# Patient Record
Sex: MALE | Race: BLACK OR AFRICAN AMERICAN | NOT HISPANIC OR LATINO
[De-identification: names, ages, dates, MRNs, and addresses within clinical notes are randomized per-mention and may not be internally consistent; named-entity substitution may affect disease eponyms.]

---

## 2021-05-28 PROBLEM — Z00.00 ENCOUNTER FOR PREVENTIVE HEALTH EXAMINATION: Status: ACTIVE | Noted: 2021-05-28

## 2021-08-24 ENCOUNTER — APPOINTMENT (OUTPATIENT)
Dept: HEPATOLOGY | Facility: CLINIC | Age: 41
End: 2021-08-24

## 2022-03-25 ENCOUNTER — APPOINTMENT (OUTPATIENT)
Dept: HEPATOLOGY | Facility: CLINIC | Age: 42
End: 2022-03-25
Payer: COMMERCIAL

## 2022-03-25 VITALS
SYSTOLIC BLOOD PRESSURE: 145 MMHG | TEMPERATURE: 97.6 F | WEIGHT: 256 LBS | RESPIRATION RATE: 15 BRPM | OXYGEN SATURATION: 98 % | BODY MASS INDEX: 37.92 KG/M2 | DIASTOLIC BLOOD PRESSURE: 93 MMHG | HEIGHT: 69 IN | HEART RATE: 98 BPM

## 2022-03-25 DIAGNOSIS — E66.9 OBESITY, UNSPECIFIED: ICD-10-CM

## 2022-03-25 DIAGNOSIS — F10.20 ALCOHOL DEPENDENCE, UNCOMPLICATED: ICD-10-CM

## 2022-03-25 DIAGNOSIS — Z78.9 OTHER SPECIFIED HEALTH STATUS: ICD-10-CM

## 2022-03-25 DIAGNOSIS — Z63.5 DISRUPTION OF FAMILY BY SEPARATION AND DIVORCE: ICD-10-CM

## 2022-03-25 DIAGNOSIS — Z60.2 PROBLEMS RELATED TO LIVING ALONE: ICD-10-CM

## 2022-03-25 DIAGNOSIS — E78.5 HYPERLIPIDEMIA, UNSPECIFIED: ICD-10-CM

## 2022-03-25 PROCEDURE — 99204 OFFICE O/P NEW MOD 45 MIN: CPT

## 2022-03-25 SDOH — SOCIAL STABILITY - SOCIAL INSECURITY: DISRUPTION OF FAMILY BY SEPARATION AND DIVORCE: Z63.5

## 2022-03-25 SDOH — SOCIAL STABILITY - SOCIAL INSECURITY: PROBLEMS RELATED TO LIVING ALONE: Z60.2

## 2022-03-28 LAB
25(OH)D3 SERPL-MCNC: 31.4 NG/ML
AFP-TM SERPL-MCNC: 2.4 NG/ML
ALBUMIN SERPL ELPH-MCNC: 4.4 G/DL
ALP BLD-CCNC: 78 U/L
ALT SERPL-CCNC: 113 U/L
ANION GAP SERPL CALC-SCNC: 15 MMOL/L
AST SERPL-CCNC: 70 U/L
BASOPHILS # BLD AUTO: 0.03 K/UL
BASOPHILS NFR BLD AUTO: 0.4 %
BILIRUB DIRECT SERPL-MCNC: 0.1 MG/DL
BILIRUB SERPL-MCNC: 0.4 MG/DL
BUN SERPL-MCNC: 12 MG/DL
CALCIUM SERPL-MCNC: 10 MG/DL
CHLORIDE SERPL-SCNC: 101 MMOL/L
CHOLEST SERPL-MCNC: 192 MG/DL
CO2 SERPL-SCNC: 23 MMOL/L
CREAT SERPL-MCNC: 0.8 MG/DL
DEPRECATED KAPPA LC FREE/LAMBDA SER: 1.18 RATIO
EGFR: 115 ML/MIN/1.73M2
EOSINOPHIL # BLD AUTO: 0.07 K/UL
EOSINOPHIL NFR BLD AUTO: 1 %
FERRITIN SERPL-MCNC: 398 NG/ML
GGT SERPL-CCNC: 94 U/L
GLUCOSE SERPL-MCNC: 119 MG/DL
HBV CORE IGG+IGM SER QL: NONREACTIVE
HBV SURFACE AB SERPL IA-ACNC: 8 MIU/ML
HBV SURFACE AG SER QL: NONREACTIVE
HCT VFR BLD CALC: 47.6 %
HCV AB SER QL: NONREACTIVE
HCV S/CO RATIO: 0.14 S/CO
HDLC SERPL-MCNC: 43 MG/DL
HEPATITIS A IGG ANTIBODY: NONREACTIVE
HGB BLD-MCNC: 14.7 G/DL
HIV1+2 AB SPEC QL IA.RAPID: NONREACTIVE
IGA SER QL IEP: 330 MG/DL
IGG SER QL IEP: 1474 MG/DL
IGM SER QL IEP: 76 MG/DL
IMM GRANULOCYTES NFR BLD AUTO: 0.7 %
INR PPP: 1.05 RATIO
IRON SATN MFR SERPL: 27 %
IRON SERPL-MCNC: 92 UG/DL
KAPPA LC CSF-MCNC: 1.66 MG/DL
KAPPA LC SERPL-MCNC: 1.96 MG/DL
LDLC SERPL CALC-MCNC: 115 MG/DL
LYMPHOCYTES # BLD AUTO: 1.83 K/UL
LYMPHOCYTES NFR BLD AUTO: 25.3 %
MAGNESIUM SERPL-MCNC: 2.1 MG/DL
MAN DIFF?: NORMAL
MCHC RBC-ENTMCNC: 28.2 PG
MCHC RBC-ENTMCNC: 30.9 GM/DL
MCV RBC AUTO: 91.2 FL
MITOCHONDRIA AB SER IF-ACNC: NORMAL
MONOCYTES # BLD AUTO: 0.66 K/UL
MONOCYTES NFR BLD AUTO: 9.1 %
NEUTROPHILS # BLD AUTO: 4.58 K/UL
NEUTROPHILS NFR BLD AUTO: 63.5 %
NONHDLC SERPL-MCNC: 150 MG/DL
PHOSPHATE SERPL-MCNC: 3.3 MG/DL
PLATELET # BLD AUTO: 288 K/UL
POTASSIUM SERPL-SCNC: 4.1 MMOL/L
PROT SERPL-MCNC: 7.6 G/DL
PT BLD: 12.4 SEC
RBC # BLD: 5.22 M/UL
RBC # FLD: 13.2 %
SMOOTH MUSCLE AB SER QL IF: NORMAL
SODIUM SERPL-SCNC: 139 MMOL/L
TIBC SERPL-MCNC: 336 UG/DL
TRIGL SERPL-MCNC: 172 MG/DL
UIBC SERPL-MCNC: 244 UG/DL
WBC # FLD AUTO: 7.22 K/UL

## 2022-03-29 LAB
A1AT PHENOTYP SERPL-IMP: NORMAL
A1AT SERPL-MCNC: 139 MG/DL

## 2022-03-30 LAB
ANA SER IF-ACNC: NEGATIVE
ZINC SERPL-MCNC: 66 UG/DL

## 2022-04-04 PROBLEM — Z60.2 LIVES ALONE: Status: ACTIVE | Noted: 2022-04-04

## 2022-04-04 PROBLEM — Z63.5 DIVORCED: Status: ACTIVE | Noted: 2022-04-04

## 2022-04-04 PROBLEM — Z78.9 HEAVY DRINKER OF ALCOHOL: Status: ACTIVE | Noted: 2022-04-04

## 2022-04-04 NOTE — HISTORY OF PRESENT ILLNESS
[de-identified] : Mr. Jackson is a 40 yo M with obesity and dyslipidemia, who is being referred for consultation regarding elevated liver enzymes.\par \par PCP: MARVIN Napoles -> MARVIN Rodriguez\par Urology: Dr. Eubanks\par \par Abdominal US (8/4/20): Diffuse fatty infiltration of the liver.\par \par He complains of right lower abdominal pain that radiates from his upper scrotum and that sometimes also radiates to his right back. The pain is not daily but definitely at least weekly and has been going on for >1 year. He does not take any pain medications (including OTC). He has noticed that certain positions and movements seem to affect the pain. The pain is not related to meals, defecation, or urination. He has been seen by a urologist and says that he had a scrotal ultrasound and was told that he had "calcium build up", but that he later had a pelvic CT 2 weeks ago that he says showed a left-sided kidney stone but "nothing to explain the pain" that is right-sided.\par \par He weighed 275 lbs at his heaviest around 2012 but intentionally lost some weight. His lowest recent weight was 205 lbs in 2014, but he says since then he has been "back and forth" from 230-260 lbs. He typically loses weight by adhering to a plant-based diet for a month at a time and exercising. During that month, he also abstains from alcohol, which he recognizes is a major source of excess calories for him. He is planning to do a plant-based diet again for the month of April and also was planning to take a "total body cleanse" from [employer] then.\par \par He calls himself a "functional alcoholic". He typically drinks tequila (2-4 pints/week), only on the his 2 days off per week that he does not work, but on those days sometimes starting in the morning and throughout the day, depending on his plans. He denies drinking on days that he works (5 days/week) and does not have withdrawal symptoms on those days. He occasionally drinks beer on his days off as well, but says that had only been a handful of times per month until recently, when he has been drinking 4 cans/week regularly in addition to the tequila. He had a DWI in 2005 or 2006 and subsequently completed a mandated outpatient rehabilitation program. He stayed sober for a while after that, then gradually started drinking again. He denies any other history of legal problems related to alcohol. He denies any work-related problems related to alcohol. He says that in prior relationships, his partners have brought up his alcohol consumption during arguments with him.\par \par He lives alone. He has a girlfriend. He has worked as a  on [employer] for the past 8 years, typically working 6pm-6am shifts 5 days per week. He has two children who live with his ex-wife (son born in 2002 and daughter born in 2007).\par \par He has no known family history of any liver disease.

## 2022-04-04 NOTE — ASSESSMENT
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 I reviewed the natural history, evaluation and staging of the disease including FibroScan (ordered today), and prognosis, including possible risks of development of alcoholic hepatitis (which can be life-threatening in the short term), cirrhosis, and hepatocellular carcinoma (HCC) as well as increased risks of diabetes mellitus, chronic kidney disease, and cardiovascular disease.\par \par We discussed the importance of long-term alcohol moderation, which for healthy men means <=2 standard drinks/day and <=14 standard drinks/week. We discussed that binge drinking and drinking without meals can further increase risk of ALD. We discussed that coffee consumption may lower risk of ALD somewhat. We discussed that if he has evidence of significant ALD including based on repeat labs today or based on his ultrasound or FibroScan, complete alcohol abstinence may be necessary to promote liver healing/regeneration. We also discussed that given his reported alcohol history, total abstinence may also be the best way to address his alcohol use disorder (AUD). He declined referral for alcohol rehabilitation today, but was open to trying MAT to reduce alcohol cravings/urges to help him moderate his intake or preferably abstain. We discussed potential benefits and risks of alternatives for MAT and will start a trial of naltrexone 50 mg po daily.\par \par We discussed that additional lifestyle modifications are also crucial for management of the NAFLD component of his fatty liver. I recommended gradual weight loss of at least 10% of his body weight. I recommended dietary changes including coffee consumption (up to 3-4 cups/day if desired), adherence to a Mediterranean style diet with increased consumption of vegetables and lean proteins, avoidance of red meat and high fructose corn syrup, and avoidance of calorie-containing beverages. I recommended moderate intensity exercise for a minimum of 20-30 minutes at least 3 times per week. These changes have been shown to lead to regression or even resolution of steatosis, inflammation, and even fibrosis in some patients.\par \par Next follow-up: 4-6 weeks

## 2022-04-04 NOTE — CONSULT LETTER
[Dear  ___] : Dear ~ASHA, [Consult Letter:] : I had the pleasure of evaluating your patient, [unfilled]. [Please see my note below.] : Please see my note below. [Consult Closing:] : Thank you very much for allowing me to participate in the care of this patient.  If you have any questions, please do not hesitate to contact me. [FreeTextEntry2] : MARVIN Rodriguez [FreeTextEntry3] : Sincerely,\par \par Luke Murphy M.D., Ph.D.\par Director, Women's Liver Health Program, [facility]\par Transplant Hepatology, [facility]\par  of Medicine\par [employer] [facility] [employer] [facility]\par [address]\[place] [address]\par Tel: [phone]\par Fax: [phone]\par Cell: [phone]\par E-mail: [email or web address]

## 2022-04-20 ENCOUNTER — APPOINTMENT (OUTPATIENT)
Dept: ULTRASOUND IMAGING | Facility: CLINIC | Age: 42
End: 2022-04-20
Payer: COMMERCIAL

## 2022-04-20 ENCOUNTER — OUTPATIENT (OUTPATIENT)
Dept: OUTPATIENT SERVICES | Facility: HOSPITAL | Age: 42
LOS: 1 days | End: 2022-04-20
Payer: COMMERCIAL

## 2022-04-20 DIAGNOSIS — R74.8 ABNORMAL LEVELS OF OTHER SERUM ENZYMES: ICD-10-CM

## 2022-04-20 DIAGNOSIS — K70.0 ALCOHOLIC FATTY LIVER: ICD-10-CM

## 2022-04-20 PROCEDURE — 76700 US EXAM ABDOM COMPLETE: CPT

## 2022-04-20 PROCEDURE — 76700 US EXAM ABDOM COMPLETE: CPT | Mod: 26

## 2022-07-01 ENCOUNTER — APPOINTMENT (OUTPATIENT)
Dept: HEPATOLOGY | Facility: CLINIC | Age: 42
End: 2022-07-01

## 2022-07-01 VITALS
OXYGEN SATURATION: 100 % | HEIGHT: 69 IN | HEART RATE: 96 BPM | BODY MASS INDEX: 38.21 KG/M2 | WEIGHT: 258 LBS | DIASTOLIC BLOOD PRESSURE: 83 MMHG | RESPIRATION RATE: 16 BRPM | TEMPERATURE: 97.7 F | SYSTOLIC BLOOD PRESSURE: 135 MMHG

## 2022-07-01 PROCEDURE — 91200 LIVER ELASTOGRAPHY: CPT

## 2022-07-01 PROCEDURE — 99214 OFFICE O/P EST MOD 30 MIN: CPT

## 2022-07-01 RX ORDER — NALTREXONE HYDROCHLORIDE 50 MG/1
50 TABLET, FILM COATED ORAL
Qty: 30 | Refills: 5 | Status: ACTIVE | COMMUNITY
Start: 2022-03-25 | End: 1900-01-01

## 2022-07-01 NOTE — CONSULT LETTER
[Dear  ___] : Dear ~ASHA, [Consult Letter:] : I had the pleasure of evaluating your patient, [unfilled]. [Please see my note below.] : Please see my note below. [Consult Closing:] : Thank you very much for allowing me to participate in the care of this patient.  If you have any questions, please do not hesitate to contact me. [FreeTextEntry2] : MARVIN Rodriguez [FreeTextEntry3] : Sincerely,\par \par Luke Murphy M.D., Ph.D.\par Director, Women's Liver Health Program, [facility]\par Transplant Hepatology, [facility]\par  of Medicine\par [employer] [facility] at [facility]\par [address]\[place] [address]\par Tel: [phone]\par Fax: [phone]\par Cell: [phone]\par E-mail: [email or web address]

## 2022-07-01 NOTE — ASSESSMENT
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 Prior sonograms (8/4/20 and 4/20/22) showed fatty liver, but with no evidence of cirrhosis or suspicious hepatic lesion.\par \par Since his last visit, he has been abstinent from alcohol for the past 3 months apart from 4 non-sustained slips (only 1 of which was a significant binge). He has also been exercising regularly. This has not translated into consistent weight loss yet. I congratulated him on these healthy changes.\par \par Given that he found naltrexone to be beneficial and with only transient adverse effects noted, I encouraged him to resume that medication. If he has persistent side effects, he can call me and we can consider switching to an alternative such as acamprosate. Naltrexone was refilled today.\par \par We will re-check labs today to assess for interval improvement in his liver enzymes with the above healthy changes. FibroScan today suggested minimal hepatic fibrosis and only mild steatosis, though with high IQR (variability) of the liver stiffness measurements suggesting that there could be underestimation. If his liver enzymes are not improving, he may need re-staging with an alternative modality such as MR elastography or liver biopsy but will defer for now.\par \par In addition to alcohol abstinence, I encouraged him to try to gradually lose weight (at least 10% of his body weight). I recommended dietary changes including coffee consumption (up to 3-4 cups/day if desired), adherence to a Mediterranean style diet with increased consumption of vegetables and lean proteins, avoidance of red meat and high fructose corn syrup, and avoidance of calorie-containing beverages. I recommended moderate intensity exercise for a minimum of 20-30 minutes at least 3 times per week. These changes have been shown to lead to regression or even resolution of steatosis, inflammation, and even fibrosis in some patients.\par \par He is a potential candidate for semaglutide (Ozempic or Wegovy) for obesity and off label for presumed concomitant nonalcoholic steatohepatitis. We discussed the potential benefits and risks of semaglutide today in detail. He is potentially interested but preferred to re-address this again at his next visit rather than starting the medication now.\par \par He is non-immune to HAV and only has borderline HBsAb and therefore I recommended vaccinations today, which he intends to schedule.\par \par Next follow-up: 3 months

## 2022-07-01 NOTE — HISTORY OF PRESENT ILLNESS
[de-identified] : Mr. Jackson is a 41 yo very pleasant Black M with obesity, dyslipidemia, and nephrolithiasis, who is being seen for follow-up of elevated liver enzymes secondary to alcohol-associated liver disease (ALD) as well as possible concomitant nonalcoholic (metabolic) fatty liver disease (NAFLD).\par \par PCP: NP Nabila Napoles -> NP Nicci Rodriguez\par Urology: Dr. Eubanks\par \par Abdominal US (8/4/20): Diffuse fatty infiltration of the liver.\par \par Abdominal US (4/20/22): Mild hepatomegaly and steatosis. Normal size spleen. No ascites.\par \par FibroScan (7/1/22): Median liver stiffness 6.4 kPA (consistent with F0-1 fibrosis, but with elevated IQR 30% suggesting possible underestimation) and CAP score 271 dB/m (consistent with S0-1 steatosis).\par \par He was last seen by me on 3/25/22 and is here today for follow-up and FibroScan (above). He reports that since his last visit, he was abstinent from alcohol for the past 3 months (April, May, and June), apart from 1 day at the end of May or beginning of June when he drank a pint of liquor at home and three other non-consecutive days prior to that when he had 2 drinks/day socially. He has not had any alcohol for the past 3+ weeks. He says the naltrexone was very helpful for cutting out alcohol, but that he self-discontinued it 3 weeks ago. He is open to resuming it, though notes that he thinks it causes some side effects of somnolence and "brain fog, just not feeling like myself" for the first few days only when first initiating it but not after those first few days if he continues the medication. In addition to cutting out alcohol, he has been exercising for the past 2 months, doing 45 minutes on an elliptical machine 2-3 times/week. He has not lost any weight yet but has noticed that his clothes are fitting better and so is hoping the weight will start to reflect his healthy changes soon too. He says his physical therapist recently suggested to him that he may benefit from Wegovy, but he doesn't want to start it yet. He has avoided herbal/dietary supplements since our last visit as discussed.\par \par He lives alone. He has a girlfriend. He has worked as a  on [employer] for the past 8 years, typically working 6pm-6am shifts 5 days per week. He has two children who live with his ex-wife (son born in 2002 and daughter born in 2007).\par \par He has no known family history of any liver disease.

## 2022-07-06 LAB
AFP-TM SERPL-MCNC: 1.9 NG/ML
ALBUMIN SERPL ELPH-MCNC: 4.1 G/DL
ALP BLD-CCNC: 68 U/L
ALT SERPL-CCNC: 22 U/L
ANION GAP SERPL CALC-SCNC: 11 MMOL/L
AST SERPL-CCNC: 20 U/L
BASOPHILS # BLD AUTO: 0.04 K/UL
BASOPHILS NFR BLD AUTO: 0.7 %
BILIRUB DIRECT SERPL-MCNC: 0.1 MG/DL
BILIRUB SERPL-MCNC: 0.3 MG/DL
BUN SERPL-MCNC: 11 MG/DL
CALCIUM SERPL-MCNC: 9.2 MG/DL
CHLORIDE SERPL-SCNC: 105 MMOL/L
CHOLEST SERPL-MCNC: 155 MG/DL
CO2 SERPL-SCNC: 24 MMOL/L
CREAT SERPL-MCNC: 0.91 MG/DL
EGFR: 109 ML/MIN/1.73M2
EOSINOPHIL # BLD AUTO: 0.27 K/UL
EOSINOPHIL NFR BLD AUTO: 4.5 %
ESTIMATED AVERAGE GLUCOSE: 120 MG/DL
GLUCOSE SERPL-MCNC: 90 MG/DL
HBA1C MFR BLD HPLC: 5.8 %
HCT VFR BLD CALC: 42.7 %
HDLC SERPL-MCNC: 39 MG/DL
HGB BLD-MCNC: 13.3 G/DL
IMM GRANULOCYTES NFR BLD AUTO: 0.3 %
INR PPP: 1.01 RATIO
LDLC SERPL CALC-MCNC: 97 MG/DL
LYMPHOCYTES # BLD AUTO: 2.51 K/UL
LYMPHOCYTES NFR BLD AUTO: 41.9 %
MAN DIFF?: NORMAL
MCHC RBC-ENTMCNC: 27.8 PG
MCHC RBC-ENTMCNC: 31.1 GM/DL
MCV RBC AUTO: 89.1 FL
MONOCYTES # BLD AUTO: 0.55 K/UL
MONOCYTES NFR BLD AUTO: 9.2 %
NEUTROPHILS # BLD AUTO: 2.6 K/UL
NEUTROPHILS NFR BLD AUTO: 43.4 %
NONHDLC SERPL-MCNC: 116 MG/DL
PLATELET # BLD AUTO: 300 K/UL
POTASSIUM SERPL-SCNC: 4.8 MMOL/L
PROT SERPL-MCNC: 7.4 G/DL
PT BLD: 11.9 SEC
RBC # BLD: 4.79 M/UL
RBC # FLD: 12.8 %
SODIUM SERPL-SCNC: 140 MMOL/L
TRIGL SERPL-MCNC: 95 MG/DL
WBC # FLD AUTO: 5.99 K/UL

## 2022-07-11 ENCOUNTER — APPOINTMENT (OUTPATIENT)
Dept: HEPATOLOGY | Facility: CLINIC | Age: 42
End: 2022-07-11

## 2022-07-11 PROCEDURE — 90471 IMMUNIZATION ADMIN: CPT

## 2022-07-11 PROCEDURE — 90636 HEP A/HEP B VACC ADULT IM: CPT

## 2022-07-13 LAB — PHOSPHATIDYETHANOL (PETH), WHOLE BLOOD: NEGATIVE NG/ML

## 2022-07-21 ENCOUNTER — EMERGENCY (EMERGENCY)
Facility: HOSPITAL | Age: 42
LOS: 1 days | Discharge: HOME | End: 2022-07-21
Attending: EMERGENCY MEDICINE
Payer: SELF-PAY

## 2022-07-21 VITALS
RESPIRATION RATE: 18 BRPM | DIASTOLIC BLOOD PRESSURE: 68 MMHG | SYSTOLIC BLOOD PRESSURE: 130 MMHG | TEMPERATURE: 98 F | OXYGEN SATURATION: 100 % | HEART RATE: 82 BPM

## 2022-07-21 VITALS
TEMPERATURE: 100 F | SYSTOLIC BLOOD PRESSURE: 135 MMHG | RESPIRATION RATE: 18 BRPM | HEART RATE: 90 BPM | OXYGEN SATURATION: 98 % | HEIGHT: 69 IN | WEIGHT: 250 LBS | DIASTOLIC BLOOD PRESSURE: 70 MMHG

## 2022-07-21 PROCEDURE — 73564 X-RAY EXAM KNEE 4 OR MORE: CPT | Mod: 26,LT

## 2022-07-21 PROCEDURE — 99284 EMERGENCY DEPT VISIT MOD MDM: CPT | Mod: 25

## 2022-07-21 PROCEDURE — 73090 X-RAY EXAM OF FOREARM: CPT

## 2022-07-21 PROCEDURE — 99284 EMERGENCY DEPT VISIT MOD MDM: CPT

## 2022-07-21 PROCEDURE — 73564 X-RAY EXAM KNEE 4 OR MORE: CPT

## 2022-07-21 PROCEDURE — 73090 X-RAY EXAM OF FOREARM: CPT | Mod: 26,LT
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
## 2022-07-21 NOTE — ED PROVIDER NOTE - NS ED ATTENDING STATEMENT MOD
This was a shared visit with the DERRICK. I reviewed and verified the documentation and independently performed the documented:
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
## 2022-07-21 NOTE — ED PROVIDER NOTE - PATIENT PORTAL LINK FT
You can access the FollowMyHealth Patient Portal offered by [facility] by registering at the following website: [email or web address]. By joining [facility]’s FollowMyHealth portal, you will also be able to view your health information using other applications (apps) compatible with our system.

## 2022-07-21 NOTE — ED PROVIDER NOTE - PHYSICAL EXAMINATION
Constitutional: Awake, alert and oriented. In no acute distress. Well appearing.  HEENT: NC/AT. Moist mucous membranes.  Eyes: No scleral icterus. EOMI.  Neck:. Soft and supple. Full ROM without pain.  Cardiac: Regular rate and regular rhythm. +S1/S2. Peripheral pulses 2+ and symmetric. No LE edema.  Respiratory: Speaking in full sentences. No evidence of respiratory distress. No wheezes, rales or rhonchi.  Abdomen: Soft, non-distended and non tender Normal bowel sounds in all 4 quadrants. No guarding or rebound. no CVAT  Back: Spine midline and non-tender.   Skin: (+) abrasion over left forearm (medial aspect), FROM LUE, no signs of infection.   Lymph: No cervical lymphadenopathy.  MSK: (+) mild left anterior knee tenderness. moving all 4 extremities FROM. pt walking in the ED with steady gait.   Neuro: Awake, alert & oriented x 3. Moves all extremities symmetrically. Negative pronator drift, strength 5/5 all upper and lower extremities, sensation to light touch intact throughout upper/ lower extremities and face, finger to nose coordination intact, cranial nerves 2-12 intact  Psych: calm, cooperative, normal affect
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
## 2022-07-21 NOTE — ED PROVIDER NOTE - CARE PROVIDER_API CALL
Quentin John)  Orthopaedic Surgery  [address] [place] [address]  Phone: [phone]  Fax: [phone]  Follow Up Time: 1-3 Days

## 2022-07-25 ENCOUNTER — TRANSCRIPTION ENCOUNTER (OUTPATIENT)
Age: 42
End: 2022-07-25

## 2022-08-17 ENCOUNTER — APPOINTMENT (OUTPATIENT)
Dept: HEPATOLOGY | Facility: CLINIC | Age: 42
End: 2022-08-17

## 2022-08-17 PROBLEM — Z78.9 OTHER SPECIFIED HEALTH STATUS: Chronic | Status: ACTIVE | Noted: 2022-07-21

## 2022-09-21 ENCOUNTER — APPOINTMENT (OUTPATIENT)
Dept: HEPATOLOGY | Facility: CLINIC | Age: 42
End: 2022-09-21

## 2022-09-21 DIAGNOSIS — K70.0 ALCOHOLIC FATTY LIVER: ICD-10-CM

## 2022-09-21 PROCEDURE — 90636 HEP A/HEP B VACC ADULT IM: CPT

## 2022-09-21 PROCEDURE — 90471 IMMUNIZATION ADMIN: CPT

## 2022-12-16 ENCOUNTER — APPOINTMENT (OUTPATIENT)
Dept: HEPATOLOGY | Facility: CLINIC | Age: 42
End: 2022-12-16

## 2023-03-01 ENCOUNTER — APPOINTMENT (OUTPATIENT)
Dept: HEPATOLOGY | Facility: CLINIC | Age: 43
End: 2023-03-01

## 2023-04-04 NOTE — ED ADULT TRIAGE NOTE - PATIENT ON (OXYGEN DELIVERY METHOD)
Rx Refill Note  Requested Prescriptions     Pending Prescriptions Disp Refills   • traZODone (DESYREL) 50 MG tablet [Pharmacy Med Name: TRAZODONE 50 MG TABLET] 30 tablet 0     Sig: TAKE 1 TABLET BY MOUTH EVERY DAY AT NIGHT   • cetirizine (zyrTEC) 10 MG tablet [Pharmacy Med Name: CETIRIZINE HCL 10 MG TABLET] 30 tablet 0     Sig: TAKE 1 TABLET BY MOUTH EVERY DAY      Last office visit with prescribing clinician: 2/24/2023   Last telemedicine visit with prescribing clinician: 5/24/2023   Next office visit with prescribing clinician: 5/24/2023                         Would you like a call back once the refill request has been completed: [] Yes [] No    If the office needs to give you a call back, can they leave a voicemail: [] Yes [] No    Bella Adames MA  04/04/23, 10:40 EDT   room air

## 2023-07-26 ENCOUNTER — APPOINTMENT (OUTPATIENT)
Dept: HEPATOLOGY | Facility: CLINIC | Age: 43
End: 2023-07-26
Payer: COMMERCIAL

## 2023-07-26 DIAGNOSIS — R74.8 ABNORMAL LEVELS OF OTHER SERUM ENZYMES: ICD-10-CM

## 2023-07-26 DIAGNOSIS — Z23 ENCOUNTER FOR IMMUNIZATION: ICD-10-CM

## 2023-07-26 PROCEDURE — 90471 IMMUNIZATION ADMIN: CPT

## 2023-07-26 PROCEDURE — 90636 HEP A/HEP B VACC ADULT IM: CPT

## 2024-04-22 NOTE — ED PROVIDER NOTE - WET READ LAUNCH FT
Medication: atorvastatin (LIPITOR) 20 MG tablet   Last office visit date: NA  Medication Refill Protocol Failed.   Seen by prescribing provider or same department within the last 12 months or has a future appt in 3 months -    
There are no Wet Read(s) to document.